# Patient Record
Sex: MALE | Race: BLACK OR AFRICAN AMERICAN | URBAN - METROPOLITAN AREA
[De-identification: names, ages, dates, MRNs, and addresses within clinical notes are randomized per-mention and may not be internally consistent; named-entity substitution may affect disease eponyms.]

---

## 2017-09-05 ENCOUNTER — EMERGENCY (EMERGENCY)
Facility: HOSPITAL | Age: 72
LOS: 0 days | Discharge: HOME | End: 2017-09-05
Attending: EMERGENCY MEDICINE
Payer: MEDICARE

## 2017-09-05 VITALS
HEART RATE: 64 BPM | HEIGHT: 69 IN | OXYGEN SATURATION: 100 % | SYSTOLIC BLOOD PRESSURE: 149 MMHG | TEMPERATURE: 208 F | WEIGHT: 220.02 LBS | DIASTOLIC BLOOD PRESSURE: 87 MMHG | RESPIRATION RATE: 18 BRPM

## 2017-09-05 DIAGNOSIS — Z98.89 OTHER SPECIFIED POSTPROCEDURAL STATES: Chronic | ICD-10-CM

## 2017-09-05 PROCEDURE — 99283 EMERGENCY DEPT VISIT LOW MDM: CPT

## 2017-09-05 RX ORDER — INSULIN GLARGINE 100 [IU]/ML
16 INJECTION, SOLUTION SUBCUTANEOUS
Qty: 0 | Refills: 0 | COMMUNITY

## 2017-09-05 RX ADMIN — Medication 300 MILLIGRAM(S): at 11:47

## 2017-09-05 NOTE — ED PROVIDER NOTE - MEDICAL DECISION MAKING DETAILS
dc with clinda and fu with plastics/dermatology. pt well appearing. aware if worsening symptoms to return to er. Discussed results and outcome of testing with the patient.  Patient given copy of available results. Patient advised to please follow up with their PMD within the next 24 hours and return to the Emergency Department for worsening symptoms or any other concerns.

## 2017-09-05 NOTE — ED PROVIDER NOTE - OBJECTIVE STATEMENT
71yo male with pmh DM, HTN, HL, presents with discharge from post scalp. Pt reports has had long standing keloid to post scalp that was excised in [place] long time ago. Pt reports started noticing lesions again in past 2 years, and seen by dermatologists who put in injections but states not helping. Pt noted past year the lesions coalescing. Pt states ~ 2 weeks ago, noted in popped and some dc came out. No fever, chills, systemic symptoms.     ROS: No fever/chills. No photophobia/eye pain/changes in vision, No ear pain/sore throat/dysphagia, No chest pain/palpitations. No SOB/cough/stridor. No abdominal pain, N/V/D, no black/bloody bm. No dysuria/frequency/discharge, No headache. No Dizziness.  + rash.  No numbness/tingling/weakness.

## 2017-09-05 NOTE — ED PROVIDER NOTE - PHYSICAL EXAMINATION
Gen: Alert, Well appearing. NAD    Head: NC, AT, PERRL, EOMI, normal lids/conjunctiva   ENT: Bilateral TM WNL, normal hearing, patent oropharynx without erythema/exudate, uvula midline  Neck: supple, no tenderness/meningismus/JVD   Pulm: Bilateral clear BS, normal resp effort, no wheeze/stridor/retractions  CV: RRR, no M/R/G, +dist pulses   Abd: soft, NT/ND, +BS, no guarding/rebound tenderness  Mskel: no edema/erythema/cyanosis   Skin: 10cm post papular nonfluctuance no erythematous lesions to post scalp. rt sided 2cm ulcerated lesion with some dc.   Neuro: AAOx3, n

## 2017-09-05 NOTE — ED ADULT NURSE NOTE - OBJECTIVE STATEMENT
" I had some irritation to my scalp. It started to swell and then I squeeszed awhole bunch of "gunk" out of it

## 2017-09-05 NOTE — ED ADULT TRIAGE NOTE - CHIEF COMPLAINT QUOTE
"I have a keloid in the back of my head that swelled up, and bursted on friday. denies any pain at the moment. Pt has hx of DM
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
## 2017-09-06 DIAGNOSIS — E11.9 TYPE 2 DIABETES MELLITUS WITHOUT COMPLICATIONS: ICD-10-CM

## 2017-09-06 DIAGNOSIS — Z79.84 LONG TERM (CURRENT) USE OF ORAL HYPOGLYCEMIC DRUGS: ICD-10-CM
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
## 2017-09-06 DIAGNOSIS — T81.89XA OTHER COMPLICATIONS OF PROCEDURES, NOT ELSEWHERE CLASSIFIED, INITIAL ENCOUNTER: ICD-10-CM

## 2017-09-06 DIAGNOSIS — J45.909 UNSPECIFIED ASTHMA, UNCOMPLICATED: ICD-10-CM

## 2017-09-06 DIAGNOSIS — Z88.0 ALLERGY STATUS TO PENICILLIN: ICD-10-CM

## 2017-09-07 LAB
CULTURE RESULTS: NO GROWTH — SIGNIFICANT CHANGE UP
SPECIMEN SOURCE: SIGNIFICANT CHANGE UP

## 2018-07-31 ENCOUNTER — APPOINTMENT (OUTPATIENT)
Dept: ENDOCRINOLOGY | Facility: CLINIC | Age: 73
End: 2018-07-31
Payer: MEDICARE

## 2018-07-31 VITALS
SYSTOLIC BLOOD PRESSURE: 154 MMHG | RESPIRATION RATE: 16 BRPM | TEMPERATURE: 98.5 F | HEIGHT: 69 IN | BODY MASS INDEX: 32.14 KG/M2 | OXYGEN SATURATION: 98 % | HEART RATE: 63 BPM | WEIGHT: 217 LBS | DIASTOLIC BLOOD PRESSURE: 90 MMHG

## 2018-07-31 DIAGNOSIS — Z78.9 OTHER SPECIFIED HEALTH STATUS: ICD-10-CM

## 2018-07-31 PROCEDURE — 99214 OFFICE O/P EST MOD 30 MIN: CPT | Mod: 25

## 2018-07-31 PROCEDURE — 82962 GLUCOSE BLOOD TEST: CPT

## 2018-08-01 LAB — GLUCOSE BLDC GLUCOMTR-MCNC: 228

## 2018-08-01 RX ORDER — ROSUVASTATIN CALCIUM 10 MG/1
10 TABLET, FILM COATED ORAL DAILY
Qty: 90 | Refills: 3 | Status: ACTIVE | COMMUNITY
Start: 2018-08-01 | End: 1900-01-01

## 2018-09-04 ENCOUNTER — MEDICATION RENEWAL (OUTPATIENT)
Age: 73
End: 2018-09-04

## 2018-11-22 ENCOUNTER — RX RENEWAL (OUTPATIENT)
Age: 73
End: 2018-11-22

## 2018-12-23 RX ORDER — GLIPIZIDE 10 MG/1
10 TABLET ORAL
Refills: 0 | Status: COMPLETED | COMMUNITY
End: 2018-12-23

## 2019-02-26 ENCOUNTER — APPOINTMENT (OUTPATIENT)
Dept: ENDOCRINOLOGY | Facility: CLINIC | Age: 74
End: 2019-02-26
Payer: MEDICARE

## 2019-02-26 VITALS
DIASTOLIC BLOOD PRESSURE: 103 MMHG | RESPIRATION RATE: 16 BRPM | TEMPERATURE: 97.7 F | HEIGHT: 69 IN | OXYGEN SATURATION: 98 % | WEIGHT: 219 LBS | SYSTOLIC BLOOD PRESSURE: 167 MMHG | BODY MASS INDEX: 32.44 KG/M2 | HEART RATE: 75 BPM

## 2019-02-26 LAB — GLUCOSE BLDC GLUCOMTR-MCNC: 318

## 2019-02-26 PROCEDURE — 82962 GLUCOSE BLOOD TEST: CPT

## 2019-02-26 PROCEDURE — 99214 OFFICE O/P EST MOD 30 MIN: CPT | Mod: 25

## 2019-02-26 NOTE — HISTORY OF PRESENT ILLNESS
[FreeTextEntry1] : Doing well, the BS at home are good on AM. No significant weight change. He is asymptomatic. He needs to see Ophthalmologist and Podiatrist. he has appointment for Cardiologist and nephrologist.

## 2019-02-26 NOTE — PHYSICAL EXAM
[Alert] : alert [No Acute Distress] : no acute distress [Normal Sclera/Conjunctiva] : normal sclera/conjunctiva [No Proptosis] : no proptosis [Normal Outer Ear/Nose] : the ears and nose were normal in appearance [Normal TMs] : both tympanic membranes were normal [No Neck Mass] : no neck mass was observed [Supple] : the neck was supple [No Respiratory Distress] : no respiratory distress [Normal Rate and Effort] : normal respiratory rhythm and effort [No Motor Deficits] : the motor exam was normal [No Sensory Deficits] : the sensory exam was normal to light touch and pinprick [Normal Sensation on Monofilament Testing] : normal sensation on monofilament testing of lower extremities

## 2019-03-06 ENCOUNTER — RX RENEWAL (OUTPATIENT)
Age: 74
End: 2019-03-06

## 2019-03-17 ENCOUNTER — RX RENEWAL (OUTPATIENT)
Age: 74
End: 2019-03-17

## 2019-03-17 RX ORDER — BLOOD-GLUCOSE METER
W/DEVICE KIT MISCELLANEOUS
Qty: 1 | Refills: 0 | Status: ACTIVE | COMMUNITY
Start: 2019-03-06 | End: 1900-01-01

## 2019-04-25 ENCOUNTER — RX RENEWAL (OUTPATIENT)
Age: 74
End: 2019-04-25

## 2019-10-19 ENCOUNTER — RX RENEWAL (OUTPATIENT)
Age: 74
End: 2019-10-19

## 2019-10-28 ENCOUNTER — APPOINTMENT (OUTPATIENT)
Dept: ENDOCRINOLOGY | Facility: CLINIC | Age: 74
End: 2019-10-28
Payer: MEDICARE

## 2019-10-28 VITALS
TEMPERATURE: 98.1 F | OXYGEN SATURATION: 98 % | DIASTOLIC BLOOD PRESSURE: 91 MMHG | RESPIRATION RATE: 16 BRPM | WEIGHT: 218 LBS | HEART RATE: 63 BPM | BODY MASS INDEX: 32.29 KG/M2 | HEIGHT: 69 IN | SYSTOLIC BLOOD PRESSURE: 148 MMHG

## 2019-10-28 DIAGNOSIS — I10 ESSENTIAL (PRIMARY) HYPERTENSION: ICD-10-CM

## 2019-10-28 LAB — GLUCOSE BLDC GLUCOMTR-MCNC: 170

## 2019-10-28 PROCEDURE — 82962 GLUCOSE BLOOD TEST: CPT

## 2019-10-28 PROCEDURE — 99214 OFFICE O/P EST MOD 30 MIN: CPT | Mod: 25

## 2019-10-28 RX ORDER — SITAGLIPTIN 50 MG/1
50 TABLET, FILM COATED ORAL DAILY
Qty: 90 | Refills: 3 | Status: COMPLETED | COMMUNITY
Start: 2018-07-31 | End: 2019-10-28

## 2019-10-28 NOTE — HISTORY OF PRESENT ILLNESS
[FreeTextEntry1] : Patient is doing well, gaining a little weight. The FBS are fine but the PPS are elevated. The renal function seems to be slightly improved. Saw the eye doctor and podiatrist. He saw a Cardiologist.

## 2019-10-28 NOTE — ASSESSMENT
[FreeTextEntry1] : The diabetes is not well controlled\par The renal function is slightly improved\par Will discontinue Januvia\par Will start Tradjenta 5 mg po QD

## 2019-10-28 NOTE — DATA REVIEWED
[FreeTextEntry1] : The FBS is good, but the HbA1c remains elevated. The renal function is slightly better.

## 2020-07-10 ENCOUNTER — APPOINTMENT (OUTPATIENT)
Dept: ENDOCRINOLOGY | Facility: CLINIC | Age: 75
End: 2020-07-10
Payer: MEDICARE

## 2020-07-10 VITALS
RESPIRATION RATE: 16 BRPM | HEIGHT: 69 IN | DIASTOLIC BLOOD PRESSURE: 78 MMHG | SYSTOLIC BLOOD PRESSURE: 125 MMHG | WEIGHT: 209 LBS | BODY MASS INDEX: 30.96 KG/M2 | OXYGEN SATURATION: 98 % | HEART RATE: 72 BPM | TEMPERATURE: 97.6 F

## 2020-07-10 LAB — GLUCOSE BLDC GLUCOMTR-MCNC: 287

## 2020-07-10 PROCEDURE — 99214 OFFICE O/P EST MOD 30 MIN: CPT | Mod: 25

## 2020-07-10 PROCEDURE — 82962 GLUCOSE BLOOD TEST: CPT

## 2020-07-10 NOTE — HISTORY OF PRESENT ILLNESS
[FreeTextEntry1] : Patient feels well , he is asymptomatic. /Patient feels tired, sleepy, he has no energy. His weight has not changed. He is exercising regularly and following the diet. His blood glucose at home are not/well controlled, they are usually around _ mg/dl. The FBS in the laboratory was 95 mg/dl and the HbA1c was 10 %. The renal function is within normal limits, the microalbumin in the urine was normal. The lipid panel was within normal limits. He denies low blood glucose during the night. He denies chest pain, or SOB. Denies numbness, tingling or burning sensation on his extremities. Taking his medications regularly. \par

## 2020-07-10 NOTE — ASSESSMENT
[FreeTextEntry1] : The diabetes still uncontrolled\par Advised to see the Nutritionist today**\par Will continue Tradjenta\par If no improvement next visit will switch to Trulicity\par Will continue the same Lantus dose\par The FBS are fine in the morning

## 2020-07-10 NOTE — PHYSICAL EXAM
[Alert] : alert [No Acute Distress] : no acute distress [Thyroid Not Enlarged] : the thyroid was not enlarged [No Neck Mass] : no neck mass was observed [No Respiratory Distress] : no respiratory distress [Clear to Auscultation] : lungs were clear to auscultation bilaterally [Normal PMI] : the apical impulse was normal [Normal Rate] : heart rate was normal

## 2020-07-13 ENCOUNTER — APPOINTMENT (OUTPATIENT)
Dept: ENDOCRINOLOGY | Facility: CLINIC | Age: 75
End: 2020-07-13
Payer: MEDICARE

## 2020-07-13 PROCEDURE — 97802 MEDICAL NUTRITION INDIV IN: CPT

## 2020-07-14 RX ORDER — PEN NEEDLE, DIABETIC 29 G X1/2"
31G X 5 MM NEEDLE, DISPOSABLE MISCELLANEOUS
Qty: 1 | Refills: 3 | Status: ACTIVE | COMMUNITY
Start: 2018-09-04 | End: 1900-01-01

## 2021-04-26 ENCOUNTER — APPOINTMENT (OUTPATIENT)
Dept: ENDOCRINOLOGY | Facility: CLINIC | Age: 76
End: 2021-04-26

## 2021-09-22 ENCOUNTER — APPOINTMENT (OUTPATIENT)
Dept: ENDOCRINOLOGY | Facility: CLINIC | Age: 76
End: 2021-09-22
Payer: MEDICARE

## 2021-09-22 VITALS
HEIGHT: 69 IN | BODY MASS INDEX: 31.7 KG/M2 | DIASTOLIC BLOOD PRESSURE: 90 MMHG | TEMPERATURE: 97.7 F | WEIGHT: 214 LBS | SYSTOLIC BLOOD PRESSURE: 133 MMHG | HEART RATE: 75 BPM | OXYGEN SATURATION: 98 % | RESPIRATION RATE: 16 BRPM

## 2021-09-22 LAB
GLUCOSE BLDC GLUCOMTR-MCNC: 94
HBA1C MFR BLD HPLC: 9.7

## 2021-09-22 PROCEDURE — 82962 GLUCOSE BLOOD TEST: CPT

## 2021-09-22 PROCEDURE — 83036 HEMOGLOBIN GLYCOSYLATED A1C: CPT | Mod: QW

## 2021-09-22 PROCEDURE — 99214 OFFICE O/P EST MOD 30 MIN: CPT | Mod: 25

## 2021-09-22 NOTE — ASSESSMENT
[FreeTextEntry1] : Patient's diabetes is not well controlled\par He needs bladder surgery soon\par Will add Jardiance 10 mg  po QD\par Will reduce the Lantus to 18 units SQ PM (he has been having low FBS)\par His BS in the evening are around 200 mg/dl\par Advised to do SBGM\par Will do a work up before next visit\par Patient will come back in 2 weeks

## 2021-09-22 NOTE — DATA REVIEWED
[No studies available for review at this time.] : No studies available for review at this time. [FreeTextEntry1] : The FBS  was 94 mg/dl and the HbA1c was 9.7%

## 2021-09-22 NOTE — HISTORY OF PRESENT ILLNESS
[FreeTextEntry1] : Patient feels well, he needs bladder surgery for a lesion found in that organ. His diabetes is not well controlled, he says the FBS are  usually around 110 to 120 but in the evening they are over 200 mg/dl. He does not want to use Trulicity due to the side effects. He has been having low FBS once or twice per week below 90 mg/dl

## 2021-09-27 ENCOUNTER — APPOINTMENT (OUTPATIENT)
Dept: ENDOCRINOLOGY | Facility: CLINIC | Age: 76
End: 2021-09-27
Payer: MEDICARE

## 2021-09-27 VITALS
SYSTOLIC BLOOD PRESSURE: 154 MMHG | WEIGHT: 215 LBS | HEIGHT: 69 IN | OXYGEN SATURATION: 98 % | BODY MASS INDEX: 31.84 KG/M2 | HEART RATE: 69 BPM | RESPIRATION RATE: 16 BRPM | TEMPERATURE: 96.6 F | DIASTOLIC BLOOD PRESSURE: 80 MMHG

## 2021-09-27 LAB
GLUCOSE BLDC GLUCOMTR-MCNC: 234
HBA1C MFR BLD HPLC: 9.4

## 2021-09-27 PROCEDURE — 82962 GLUCOSE BLOOD TEST: CPT

## 2021-09-27 PROCEDURE — 99214 OFFICE O/P EST MOD 30 MIN: CPT | Mod: 25

## 2021-09-27 PROCEDURE — 83036 HEMOGLOBIN GLYCOSYLATED A1C: CPT | Mod: QW

## 2021-09-27 NOTE — DATA REVIEWED
[FreeTextEntry1] : The FBS was 130 and the HbA1c 9.4%. The SBGM at home discloses FBS from 83 mg/dl to 130 mg/dl.

## 2021-09-27 NOTE — ASSESSMENT
[FreeTextEntry1] : The diabetes is better controlled\par The HbA1c has not changed much because there is only one week since the previous one\par It is important for him to have the bladder surgery done soon (I spoke with the Urologist)\par Advised to keep following the diet and walking regularly\par Will do blood tests in the lab today \par He will call me tomorrow for results

## 2021-09-27 NOTE — HISTORY OF PRESENT ILLNESS
[FreeTextEntry1] : Patient feels better, he is following the diet more closely. The FBS is very good , the PPS still elevated. The Hba1c has improved minimally compared with the previous visit. 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 He has seen his PCP and Cardiologist. Dr. [facility] Phone: [phone] Fax: [phone]

## 2021-09-29 LAB
ALBUMIN SERPL ELPH-MCNC: 4.6 G/DL
ALP BLD-CCNC: 107 U/L
ALT SERPL-CCNC: 22 U/L
ANION GAP SERPL CALC-SCNC: 14 MMOL/L
AST SERPL-CCNC: 29 U/L
BILIRUB DIRECT SERPL-MCNC: 0.1 MG/DL
BILIRUB INDIRECT SERPL-MCNC: 0.3 MG/DL
BILIRUB SERPL-MCNC: 0.4 MG/DL
BUN SERPL-MCNC: 37 MG/DL
CALCIUM SERPL-MCNC: 10.1 MG/DL
CHLORIDE SERPL-SCNC: 97 MMOL/L
CO2 SERPL-SCNC: 27 MMOL/L
CREAT SERPL-MCNC: 2.26 MG/DL
ESTIMATED AVERAGE GLUCOSE: 226 MG/DL
FRUCTOSAMINE SERPL-MCNC: 457 UMOL/L
GLUCOSE BS SERPL-MCNC: 201 MG/DL
GLUCOSE SERPL-MCNC: 202 MG/DL
HBA1C MFR BLD HPLC: 9.5 %
POTASSIUM SERPL-SCNC: 5 MMOL/L
PROT SERPL-MCNC: 7.7 G/DL
SODIUM SERPL-SCNC: 138 MMOL/L
T4 FREE SERPL-MCNC: 1.2 NG/DL
TSH SERPL-ACNC: 2.51 UIU/ML

## 2022-08-09 ENCOUNTER — APPOINTMENT (OUTPATIENT)
Dept: ENDOCRINOLOGY | Facility: CLINIC | Age: 77
End: 2022-08-09

## 2022-08-09 VITALS
BODY MASS INDEX: 30.81 KG/M2 | RESPIRATION RATE: 16 BRPM | HEART RATE: 75 BPM | DIASTOLIC BLOOD PRESSURE: 81 MMHG | HEIGHT: 69 IN | WEIGHT: 208 LBS | OXYGEN SATURATION: 96 % | SYSTOLIC BLOOD PRESSURE: 125 MMHG | TEMPERATURE: 97.2 F

## 2022-08-09 LAB — GLUCOSE BLDC GLUCOMTR-MCNC: 170

## 2022-08-09 PROCEDURE — 99214 OFFICE O/P EST MOD 30 MIN: CPT

## 2022-08-09 PROCEDURE — 82962 GLUCOSE BLOOD TEST: CPT

## 2022-08-09 RX ORDER — LOSARTAN POTASSIUM AND HYDROCHLOROTHIAZIDE 25; 100 MG/1; MG/1
100-25 TABLET ORAL
Qty: 90 | Refills: 0 | Status: ACTIVE | COMMUNITY
Start: 2022-05-31

## 2022-08-09 RX ORDER — ROSUVASTATIN CALCIUM 20 MG/1
20 TABLET, FILM COATED ORAL
Refills: 0 | Status: ACTIVE | COMMUNITY
Start: 2021-08-17

## 2022-08-09 RX ORDER — LINAGLIPTIN 5 MG/1
5 TABLET, FILM COATED ORAL
Qty: 90 | Refills: 3 | Status: COMPLETED | COMMUNITY
Start: 2019-10-28 | End: 2022-08-09

## 2022-08-09 RX ORDER — AMLODIPINE BESYLATE 5 MG/1
5 TABLET ORAL
Qty: 90 | Refills: 0 | Status: ACTIVE | COMMUNITY
Start: 2021-08-17

## 2022-08-09 RX ORDER — MONTELUKAST 10 MG/1
10 TABLET, FILM COATED ORAL
Qty: 90 | Refills: 0 | Status: ACTIVE | COMMUNITY
Start: 2021-08-17

## 2022-08-09 RX ORDER — METOPROLOL SUCCINATE 50 MG/1
50 TABLET, EXTENDED RELEASE ORAL
Qty: 90 | Refills: 0 | Status: ACTIVE | COMMUNITY
Start: 2021-08-17

## 2022-08-09 NOTE — HISTORY OF PRESENT ILLNESS
[FreeTextEntry1] : Patient feels well , he is asymptomatic. His weight has increased. He is exercising regularly and following the diet. His blood glucose at home are not well controlled, they are usually around 140 to 160 mg/dl. He denies low blood glucose during the night. He denies chest pain, or SOB. Denies numbness, tingling or burning sensation on his extremities. Taking his medications regularly. He has seen the Ophthalmologist recently. He has seen Podiatrist recently. He has seen the Cardiologist recently.\par

## 2022-08-09 NOTE — ASSESSMENT
[FreeTextEntry1] : Poor diabetic control\par Will add Trulicity weekly\par Advised about the possible side effects\par Will discontinue Tradjenta\par Will raise the Jardiance 25 mg po QD\par Advised to call me if the BS remains elevated

## 2022-08-09 NOTE — DATA REVIEWED
[FreeTextEntry1] : The FBS and hbA1c are elevated. The creatinine has improved slightly. The lipid panel was fine.

## 2023-08-28 ENCOUNTER — APPOINTMENT (OUTPATIENT)
Dept: ENDOCRINOLOGY | Facility: CLINIC | Age: 78
End: 2023-08-28
Payer: MEDICARE

## 2023-08-28 VITALS
TEMPERATURE: 97.2 F | HEART RATE: 78 BPM | SYSTOLIC BLOOD PRESSURE: 123 MMHG | WEIGHT: 200 LBS | DIASTOLIC BLOOD PRESSURE: 76 MMHG | BODY MASS INDEX: 29.62 KG/M2 | RESPIRATION RATE: 16 BRPM | HEIGHT: 69 IN | OXYGEN SATURATION: 97 %

## 2023-08-28 LAB — GLUCOSE BLDC GLUCOMTR-MCNC: 171

## 2023-08-28 PROCEDURE — 99214 OFFICE O/P EST MOD 30 MIN: CPT | Mod: 25

## 2023-08-28 PROCEDURE — 82962 GLUCOSE BLOOD TEST: CPT

## 2023-08-28 NOTE — DATA REVIEWED
[FreeTextEntry1] : His TSH was borderline elevated with normal Free T4. The FBS and HbA1c were elevated. His COLETTE and creatinine were elevated. The TSH and Free t4 are normal.

## 2023-08-28 NOTE — HISTORY OF PRESENT ILLNESS
[FreeTextEntry1] : Patient feels well, he is asymptomatic. He has lost weight. He is not exercising regularly or following the diet. His blood sugars at home are not well controlled, they are usually around 120 mg/dl. He denies low blood glucose during the night. He denies chest pain, or SOB. Denies numbness, tingling or burning sensation on his extremities. Taking his medications regularly. He has seen the Ophthalmologist recently. He has seen the Podiatrist recently. He has seen the Cardiologist recently.

## 2023-08-28 NOTE — ASSESSMENT
[FreeTextEntry1] : The patient feels well He is losing weight He has poor diabetic control The TIR is only 41%, the GMI 8.1%. The HbA1c is very elevated He has not been using Trulicity He says he is not available in [place] The medication was prescribe again He will ask his PCP in [place] for a similar medication Patient will come back in 6 weeks

## 2023-09-10 ENCOUNTER — RX RENEWAL (OUTPATIENT)
Age: 78
End: 2023-09-10

## 2023-09-25 ENCOUNTER — APPOINTMENT (OUTPATIENT)
Dept: ENDOCRINOLOGY | Facility: CLINIC | Age: 78
End: 2023-09-25
Payer: MEDICARE

## 2023-09-25 VITALS
BODY MASS INDEX: 29.62 KG/M2 | HEART RATE: 87 BPM | HEIGHT: 69 IN | DIASTOLIC BLOOD PRESSURE: 75 MMHG | SYSTOLIC BLOOD PRESSURE: 121 MMHG | OXYGEN SATURATION: 96 % | TEMPERATURE: 97.4 F | RESPIRATION RATE: 16 BRPM | WEIGHT: 200 LBS

## 2023-09-25 DIAGNOSIS — E78.5 HYPERLIPIDEMIA, UNSPECIFIED: ICD-10-CM

## 2023-09-25 PROCEDURE — 99214 OFFICE O/P EST MOD 30 MIN: CPT

## 2024-06-20 ENCOUNTER — APPOINTMENT (OUTPATIENT)
Dept: ENDOCRINOLOGY | Facility: CLINIC | Age: 79
End: 2024-06-20
Payer: MEDICARE

## 2024-06-20 VITALS
RESPIRATION RATE: 16 BRPM | HEIGHT: 69 IN | DIASTOLIC BLOOD PRESSURE: 79 MMHG | TEMPERATURE: 97.3 F | BODY MASS INDEX: 29.18 KG/M2 | WEIGHT: 197 LBS | HEART RATE: 65 BPM | SYSTOLIC BLOOD PRESSURE: 128 MMHG | OXYGEN SATURATION: 96 %

## 2024-06-20 DIAGNOSIS — E11.21 TYPE 2 DIABETES MELLITUS WITH DIABETIC NEPHROPATHY: ICD-10-CM

## 2024-06-20 DIAGNOSIS — E11.9 TYPE 2 DIABETES MELLITUS W/OUT COMPLICATIONS: ICD-10-CM

## 2024-06-20 LAB — GLUCOSE BLDC GLUCOMTR-MCNC: 182

## 2024-06-20 PROCEDURE — 82962 GLUCOSE BLOOD TEST: CPT

## 2024-06-20 PROCEDURE — G2211 COMPLEX E/M VISIT ADD ON: CPT

## 2024-06-20 PROCEDURE — 99214 OFFICE O/P EST MOD 30 MIN: CPT

## 2024-06-20 RX ORDER — DULAGLUTIDE 3 MG/.5ML
3 INJECTION, SOLUTION SUBCUTANEOUS
Qty: 12 | Refills: 3 | Status: ACTIVE | COMMUNITY
Start: 2022-08-09 | End: 1900-01-01

## 2024-06-20 RX ORDER — BLOOD SUGAR DIAGNOSTIC
STRIP MISCELLANEOUS TWICE DAILY
Qty: 180 | Refills: 3 | Status: ACTIVE | COMMUNITY
Start: 2019-03-06 | End: 1900-01-01

## 2024-06-20 RX ORDER — EMPAGLIFLOZIN 25 MG/1
25 TABLET, FILM COATED ORAL
Qty: 90 | Refills: 3 | Status: ACTIVE | COMMUNITY
Start: 2021-09-22 | End: 1900-01-01

## 2024-06-20 RX ORDER — GLIPIZIDE 10 MG/1
10 TABLET, FILM COATED, EXTENDED RELEASE ORAL
Qty: 90 | Refills: 0 | Status: COMPLETED | COMMUNITY
Start: 2018-11-22 | End: 2024-06-20

## 2024-06-20 RX ORDER — INSULIN GLARGINE 100 [IU]/ML
100 INJECTION, SOLUTION SUBCUTANEOUS
Qty: 10 | Refills: 4 | Status: ACTIVE | COMMUNITY
Start: 2018-07-31 | End: 1900-01-01

## 2024-06-20 NOTE — DATA REVIEWED
[FreeTextEntry1] : The FBS and HbA1c were elevated. His renal function has deteriorated. No microalbuminuria.  His BP was fine. His lipid panel was fine.

## 2024-06-20 NOTE — HISTORY OF PRESENT ILLNESS
[FreeTextEntry1] : Patient feels well, he is asymptomatic. He has lost weight. He is exercising regularly and following the diet. His blood sugars at home are not/well controlled, they are usually around 140 mg/dl. He denies low blood glucose during the night. He denies chest pain, or SOB. Denies numbness, tingling or burning sensation on his extremities. Taking his medications regularly. He has not seen the Ophthalmologist recently. He has not seen the Podiatrist recently. He has not seen the Cardiologist recently. he has not seen the Nephrologist.

## 2024-06-20 NOTE — PHYSICAL EXAM
[Alert] : alert [No Acute Distress] : no acute distress [No Neck Mass] : no neck mass was observed [Thyroid Not Enlarged] : the thyroid was not enlarged [No Respiratory Distress] : no respiratory distress [Clear to Auscultation] : lungs were clear to auscultation bilaterally [Normal PMI] : the apical impulse was normal [Normal Rate] : heart rate was normal [No Edema] : no peripheral edema [No Varicosities] : there were no varicosital changes [Normal] : normal [Full ROM] : with full range of motion [1+] : 1+ in the dorsalis pedis [Diminished Throughout Both Feet] : normal tactile sensation with monofilament testing throughout both feet

## 2024-06-20 NOTE — ASSESSMENT
[FreeTextEntry1] : The diabetes is poorly control He restarted the Trulicity only yesterday Advised to follow the diet and walk regularly Will discontinue the Glipizide The creatinine level has deteriorated Advised to see the Nephrologist Will repeat the blood tests in 6 weeks

## 2024-07-02 ENCOUNTER — OUTPATIENT (OUTPATIENT)
Dept: OUTPATIENT SERVICES | Facility: HOSPITAL | Age: 79
LOS: 1 days | Discharge: HOME | End: 2024-07-02

## 2024-07-02 ENCOUNTER — APPOINTMENT (OUTPATIENT)
Dept: SPEECH THERAPY | Facility: CLINIC | Age: 79
End: 2024-07-02

## 2024-07-02 DIAGNOSIS — Z98.89 OTHER SPECIFIED POSTPROCEDURAL STATES: Chronic | ICD-10-CM

## 2024-07-16 DIAGNOSIS — H90.3 SENSORINEURAL HEARING LOSS, BILATERAL: ICD-10-CM

## 2024-08-01 ENCOUNTER — APPOINTMENT (OUTPATIENT)
Dept: ENDOCRINOLOGY | Facility: CLINIC | Age: 79
End: 2024-08-01
Payer: MEDICARE

## 2024-08-01 VITALS
HEIGHT: 69 IN | WEIGHT: 197 LBS | BODY MASS INDEX: 29.18 KG/M2 | SYSTOLIC BLOOD PRESSURE: 115 MMHG | RESPIRATION RATE: 16 BRPM | OXYGEN SATURATION: 93 % | HEART RATE: 75 BPM | TEMPERATURE: 96.3 F | DIASTOLIC BLOOD PRESSURE: 74 MMHG

## 2024-08-01 DIAGNOSIS — E78.5 HYPERLIPIDEMIA, UNSPECIFIED: ICD-10-CM

## 2024-08-01 DIAGNOSIS — E11.9 TYPE 2 DIABETES MELLITUS W/OUT COMPLICATIONS: ICD-10-CM

## 2024-08-01 DIAGNOSIS — E11.21 TYPE 2 DIABETES MELLITUS WITH DIABETIC NEPHROPATHY: ICD-10-CM

## 2024-08-01 PROCEDURE — 99214 OFFICE O/P EST MOD 30 MIN: CPT

## 2024-08-01 PROCEDURE — 82962 GLUCOSE BLOOD TEST: CPT

## 2024-08-01 PROCEDURE — G2211 COMPLEX E/M VISIT ADD ON: CPT

## 2024-08-01 NOTE — DATA REVIEWED
[FreeTextEntry1] : The FBS and HbA1c have improved. The renal function is stable. The TIR and GMI have improved.

## 2024-08-01 NOTE — HISTORY OF PRESENT ILLNESS
[FreeTextEntry1] : Patient feels well he is trying to follow the diet and exercise. No weight change. His FBS at home are improved. No side effects with Trulicity.

## 2024-08-01 NOTE — ASSESSMENT
[FreeTextEntry1] : The diabetes has improved The TIR and GMI have also improved His renal function is stable Will raise the Trulicity dose Will repeat the blood tests in 6 weeks
1

## 2024-09-09 ENCOUNTER — APPOINTMENT (OUTPATIENT)
Dept: ENDOCRINOLOGY | Facility: CLINIC | Age: 79
End: 2024-09-09
Payer: MEDICARE

## 2024-09-09 VITALS
WEIGHT: 194 LBS | OXYGEN SATURATION: 96 % | BODY MASS INDEX: 28.73 KG/M2 | DIASTOLIC BLOOD PRESSURE: 75 MMHG | RESPIRATION RATE: 16 BRPM | HEIGHT: 69 IN | HEART RATE: 60 BPM | TEMPERATURE: 96 F | SYSTOLIC BLOOD PRESSURE: 119 MMHG

## 2024-09-09 DIAGNOSIS — E11.9 TYPE 2 DIABETES MELLITUS W/OUT COMPLICATIONS: ICD-10-CM

## 2024-09-09 DIAGNOSIS — E11.21 TYPE 2 DIABETES MELLITUS WITH DIABETIC NEPHROPATHY: ICD-10-CM

## 2024-09-09 LAB — GLUCOSE BLDC GLUCOMTR-MCNC: 147

## 2024-09-09 PROCEDURE — G2211 COMPLEX E/M VISIT ADD ON: CPT

## 2024-09-09 PROCEDURE — 82962 GLUCOSE BLOOD TEST: CPT

## 2024-09-09 PROCEDURE — 99214 OFFICE O/P EST MOD 30 MIN: CPT

## 2024-09-09 NOTE — DATA REVIEWED
[FreeTextEntry1] : The FBS and hbA1c have improved. The renal function has also improved. The lipid panel was normal. The TSH and Free t4 were normal.

## 2024-09-09 NOTE — ASSESSMENT
[FreeTextEntry1] : The diabetes has improved He has lost weight The renal function has also improved Will continue the same treatment Will raise the Mounjaro dose weekly

## 2024-09-09 NOTE — HISTORY OF PRESENT ILLNESS
[FreeTextEntry1] : Patient feels well, he is asymptomatic. He has lost weight. He is exercising regularly and following the diet. His blood sugars at home are not well controlled, they are usually around 130 mg/dl. He denies low blood glucose during the night. He denies chest pain, or SOB. Denies numbness, tingling or burning sensation on his extremities. Taking his medications regularly. He has seen the Ophthalmologist recently. He has seen the Podiatrist recently. He has seen the Cardiologist recently.

## 2024-09-11 ENCOUNTER — RX CHANGE (OUTPATIENT)
Age: 79
End: 2024-09-11

## 2025-08-02 ENCOUNTER — NON-APPOINTMENT (OUTPATIENT)
Age: 80
End: 2025-08-02

## 2025-08-04 ENCOUNTER — APPOINTMENT (OUTPATIENT)
Dept: ENDOCRINOLOGY | Facility: CLINIC | Age: 80
End: 2025-08-04
Payer: MEDICARE

## 2025-08-04 VITALS
HEART RATE: 78 BPM | TEMPERATURE: 96.3 F | OXYGEN SATURATION: 97 % | DIASTOLIC BLOOD PRESSURE: 80 MMHG | RESPIRATION RATE: 16 BRPM | SYSTOLIC BLOOD PRESSURE: 144 MMHG | BODY MASS INDEX: 28.5 KG/M2 | WEIGHT: 193 LBS

## 2025-08-04 DIAGNOSIS — E11.9 TYPE 2 DIABETES MELLITUS W/OUT COMPLICATIONS: ICD-10-CM

## 2025-08-04 DIAGNOSIS — E11.21 TYPE 2 DIABETES MELLITUS WITH DIABETIC NEPHROPATHY: ICD-10-CM

## 2025-08-04 LAB — GLUCOSE BLDC GLUCOMTR-MCNC: 170

## 2025-08-04 PROCEDURE — 99214 OFFICE O/P EST MOD 30 MIN: CPT

## 2025-08-04 PROCEDURE — 82962 GLUCOSE BLOOD TEST: CPT

## 2025-08-04 PROCEDURE — G2211 COMPLEX E/M VISIT ADD ON: CPT

## 2025-08-13 RX ORDER — DULAGLUTIDE 4.5 MG/.5ML
4.5 INJECTION, SOLUTION SUBCUTANEOUS
Qty: 2 | Refills: 3 | Status: ACTIVE | COMMUNITY
Start: 2025-08-13 | End: 1900-01-01